# Patient Record
Sex: FEMALE | Race: WHITE
[De-identification: names, ages, dates, MRNs, and addresses within clinical notes are randomized per-mention and may not be internally consistent; named-entity substitution may affect disease eponyms.]

---

## 2018-05-17 ENCOUNTER — HOSPITAL ENCOUNTER (OUTPATIENT)
Dept: HOSPITAL 96 - M.MRI | Age: 63
End: 2018-05-17
Attending: ORTHOPAEDIC SURGERY
Payer: MEDICARE

## 2018-05-17 DIAGNOSIS — M25.462: ICD-10-CM

## 2018-05-17 DIAGNOSIS — M17.12: Primary | ICD-10-CM

## 2018-06-28 LAB
ALBUMIN SERPL-MCNC: 3.6 G/DL (ref 3.4–5)
ALP SERPL-CCNC: 116 U/L (ref 46–116)
ALT SERPL-CCNC: 32 U/L (ref 30–65)
ANION GAP SERPL CALC-SCNC: 5 MMOL/L (ref 7–16)
AST SERPL-CCNC: 30 U/L (ref 15–37)
BILIRUB SERPL-MCNC: 0.5 MG/DL
BILIRUB UR-MCNC: NEGATIVE MG/DL
BUN SERPL-MCNC: 18 MG/DL (ref 7–18)
CALCIUM SERPL-MCNC: 9.5 MG/DL (ref 8.5–10.1)
CHLORIDE SERPL-SCNC: 103 MMOL/L (ref 98–107)
CO2 SERPL-SCNC: 31 MMOL/L (ref 21–32)
COLOR UR: YELLOW
CREAT SERPL-MCNC: 1.1 MG/DL (ref 0.6–1.3)
GLUCOSE SERPL-MCNC: 94 MG/DL (ref 70–99)
HCT VFR BLD CALC: 38.1 % (ref 37–47)
HGB BLD-MCNC: 12.7 GM/DL (ref 12–15)
INR PPP: 1
KETONES UR STRIP-MCNC: NEGATIVE MG/DL
MCH RBC QN AUTO: 29.8 PG (ref 26–34)
MCHC RBC AUTO-ENTMCNC: 33.2 G/DL (ref 28–37)
MCV RBC: 89.7 FL (ref 80–100)
MPV: 11.4 FL. (ref 7.2–11.1)
PLATELET COUNT*: 171 THOU/UL (ref 150–400)
POTASSIUM SERPL-SCNC: 4 MMOL/L (ref 3.5–5.1)
PROT SERPL-MCNC: 7.5 G/DL (ref 6.4–8.2)
PROT UR QL STRIP: NEGATIVE
PROTHROMBIN TIME: 10 SECONDS (ref 9.2–11.5)
RBC # BLD AUTO: 4.25 MIL/UL (ref 4.2–5)
RBC # UR STRIP: NEGATIVE /UL
RDW-CV: 13.4 % (ref 10.5–14.5)
SODIUM SERPL-SCNC: 139 MMOL/L (ref 136–145)
SP GR UR STRIP: <= 1.005 (ref 1–1.03)
URINE CLARITY: CLEAR
URINE GLUCOSE-RANDOM: NEGATIVE
URINE LEUKOCYTES-REFLEX: NEGATIVE
URINE NITRITE-REFLEX: NEGATIVE
UROBILINOGEN UR STRIP-ACNC: 0.2 E.U./DL (ref 0.2–1)
WBC # BLD AUTO: 5.4 THOU/UL (ref 4–11)

## 2018-06-28 NOTE — EKG
[place] [address]
Phone:  [phone]                     ELECTROCARDIOGRAM REPORT      
_______________________________________________________________________________
 
Name:       THOR ARAIZA                Room:                      PRE IN  
[facility]#:  [id number]      Account #:      [id number]  
Admission:               Attend Phys:    IVANIA Baez
Discharge:               Date of Birth:  [birth year]  
         Report #: [id number]
    [id number]
_______________________________________________________________________________
THIS REPORT FOR:  //name//                      
 
                          [facility]
                                       
Test Date:    2018               Test Time:    09:02:06
Pat Name:     THOR ARAIZA            Department:   
Patient ID:   [id number]            Room:          
Gender:       F                        Technician:   
:          1955               Requested By: Jarvis Conley
Order Number: [id number]    Reading MD:   Alfonso Caballero
                                 Measurements
Intervals                              Axis          
Rate:         90                       P:            54
WY:           196                      QRS:          -8
QRSD:         86                       T:            32
QT:           356                                    
QTc:          436                                    
                           Interpretive Statements
Sinus rhythm
Compared to ECG 2008 13:11:12
T-wave abnormality no longer present
 
Electronically Signed On 2018 11:32:50 CDT by Alfonso Caballero
[email or web address]
 
 
 
 
 
 
 
 
 
 
 
 
 
 
 
 
 
 
 
  <ELECTRONICALLY SIGNED>
                                           By: Alfonso Caballero MD, [facility]   
  18     1132
D: 18 0902   _____________________________________
T: 18   Alfonso Caballero MD, FACC     /EPI

## 2018-07-10 ENCOUNTER — HOSPITAL ENCOUNTER (INPATIENT)
Dept: HOSPITAL 96 - M.PRE | Age: 63
LOS: 1 days | Discharge: HOME HEALTH | DRG: 470 | End: 2018-07-11
Attending: INTERNAL MEDICINE | Admitting: INTERNAL MEDICINE
Payer: MEDICARE

## 2018-07-10 VITALS — DIASTOLIC BLOOD PRESSURE: 84 MMHG | SYSTOLIC BLOOD PRESSURE: 134 MMHG

## 2018-07-10 VITALS — DIASTOLIC BLOOD PRESSURE: 72 MMHG | SYSTOLIC BLOOD PRESSURE: 131 MMHG

## 2018-07-10 VITALS — BODY MASS INDEX: 35.33 KG/M2 | HEIGHT: 62 IN | WEIGHT: 192 LBS

## 2018-07-10 VITALS — DIASTOLIC BLOOD PRESSURE: 69 MMHG | SYSTOLIC BLOOD PRESSURE: 110 MMHG

## 2018-07-10 DIAGNOSIS — Z96.1: ICD-10-CM

## 2018-07-10 DIAGNOSIS — D64.9: ICD-10-CM

## 2018-07-10 DIAGNOSIS — Z98.42: ICD-10-CM

## 2018-07-10 DIAGNOSIS — J45.909: ICD-10-CM

## 2018-07-10 DIAGNOSIS — G62.9: ICD-10-CM

## 2018-07-10 DIAGNOSIS — Z98.41: ICD-10-CM

## 2018-07-10 DIAGNOSIS — G43.909: ICD-10-CM

## 2018-07-10 DIAGNOSIS — Z96.651: ICD-10-CM

## 2018-07-10 DIAGNOSIS — I10: ICD-10-CM

## 2018-07-10 DIAGNOSIS — Z91.040: ICD-10-CM

## 2018-07-10 DIAGNOSIS — M25.462: ICD-10-CM

## 2018-07-10 DIAGNOSIS — M17.12: Primary | ICD-10-CM

## 2018-07-10 DIAGNOSIS — E66.9: ICD-10-CM

## 2018-07-10 DIAGNOSIS — K21.9: ICD-10-CM

## 2018-07-10 PROCEDURE — 0SRD0J9 REPLACEMENT OF LEFT KNEE JOINT WITH SYNTHETIC SUBSTITUTE, CEMENTED, OPEN APPROACH: ICD-10-PCS | Performed by: ORTHOPAEDIC SURGERY

## 2018-07-11 VITALS — SYSTOLIC BLOOD PRESSURE: 108 MMHG | DIASTOLIC BLOOD PRESSURE: 49 MMHG

## 2018-07-11 VITALS — DIASTOLIC BLOOD PRESSURE: 53 MMHG | SYSTOLIC BLOOD PRESSURE: 112 MMHG

## 2018-07-11 VITALS — SYSTOLIC BLOOD PRESSURE: 120 MMHG | DIASTOLIC BLOOD PRESSURE: 59 MMHG

## 2018-07-11 VITALS — SYSTOLIC BLOOD PRESSURE: 112 MMHG | DIASTOLIC BLOOD PRESSURE: 53 MMHG

## 2018-07-11 LAB
HCT VFR BLD CALC: 30.8 % (ref 37–47)
HGB BLD-MCNC: 10 GM/DL (ref 12–15)

## 2018-07-11 NOTE — OP
[facility] 
[address] [place]  [address]                    OPERATIVE REPORT              
_______________________________________________________________________________
 
Name:       THOR ARAIZA                Room:           [address] [place]  
.R.#:  [id number]      Account #:      [id number]  
Admission:  07/10/18     Attend Phys:    IVANIA Baez
Discharge:  07/11/18     Date of Birth:  [birth year]  
         Report #: [id number]
                                                                     [id number]  
_______________________________________________________________________________
THIS REPORT FOR:  //name//                      
 
CC: Jarvis Painter
 
DATE OF SERVICE:  07/10/2018
 
 
PREOPERATIVE DIAGNOSIS:  Left knee osteoarthritis.
 
POSTOPERATIVE DIAGNOSIS:  Left knee osteoarthritis.
 
PROCEDURE:  Left total knee arthroplasty.
 
ANESTHESIA:  General endotracheal.
 
ESTIMATED BLOOD LOSS:  50 mL.
 
SURGEON:  Jarvis Conley II, DO
 
FIRST ASSISTANT:  YOSVANY Fish.
 
ANTIBIOTICS:  Ancef preoperatively.
 
DRAINS:  Medium Hemovac.
 
COMPLICATIONS:  None.
 
CONDITION:  The patient stable to recovery room.
 
IMPLANTS:  Listed in the operative record and progress note.
 
BRIEF HISTORY:  The patient was seen in preoperative area.  Preoperative H and P
was performed.  Site was marked, questions were answered.  Risks and benefits
were discussed with the patient in detail about the surgery.  The patient wished
to procedure, assumed all risks.
 
DESCRIPTION OF PROCEDURE:  The patient was taken to the operative suite, placed
supine on the operating room table and given appropriate anesthesia.  A
well-padded tourniquet was applied in the upper thigh, which inflated to 300
mmHg after gravity exsanguination.  The operative knee was sterilely prepped and
draped.  Surgery was begun by midline incision.  This was carried down to
subcutaneous tissues.  A medial parapatellar arthrotomy was performed and
carried down to the bone.  Patella was then everted and excess soft tissue
removed from around the femur.  A femoral cutting guide was then applied,
 
 
 
[place] [id number]                    OPERATIVE REPORT              
_______________________________________________________________________________
 
Name:       THOR ARAIZA                Room:           [address]#:  [id number]      Account #:      [id number]  
Admission:  07/10/18     Attend Phys:    IVANIA Baez
Discharge:  07/11/18     Date of Birth:  [birth year]  
         Report #: [id number]
                                                                     [id number]  
_______________________________________________________________________________
checked with a drop hilary for rotational alignment, pinned into appropriate
position and appropriate cuts were made.  A 4-in-1 cutting block was then
applied, checked for rotational alignment, pinned in appropriate position and
appropriate cuts were made.  The tibia was then exposed.  The excess meniscus
was removed.  Retraction was placed on the collateral ligaments.  The tibial
cutting block was then applied, pinned in appropriate position, checked with a
drop hilary for rotational alignment and slope and appropriate cut was made. 
Tibial bone was removed.  Tibial base plate was then applied and checked for
rotational alignment with the drop hilray and pinned in appropriate position.  The
femur was then applied in box that was reamed.  This was then trialed with the
appropriate spacer, which showed excellent fit and fill and excellent stability
of the knee through all range of motion.  The patella was then reamed in
appropriate fashion and sized to appropriate size.  Three peg holes were
drilled.  It was then trialed and it showed excellent flexion and extension and
excellent tracking of the patella within the groove.  These trials were then
removed.  The tibia was punched in appropriate fashion.  Bone ends were cleansed
with Pulsavac irrigation and cement was mixed and applied to the final implants.
 These were then malleted in position and held the knee in extension and
compressed to allow the cement to cure.  After it cured, excess was removed
utilizing a [place]man and osteotome.  The wound was then copiously irrigated and the
final spacer was then malleted in position.  The tourniquet was deflated. 
Hemostasis was maintained with electrocautery and cocktail was injected.  PRP
gel was sprayed throughout the internal aspects of the knee.  A medium Hemovac
drain was applied.  Capsule was closed with two FiberWire in a figure-of-eight
fashion.  Skin was closed with 2-0 Vicryl and running 3-0 Monocryl.  Dermabond
and sterile dressing applied.  Ace wrap and PolarCare were applied.  The patient
was transported to the recovery in stable condition.  Counts were correct
throughout the procedure.
 
 
 
 
 
 
 
 
 
 
 
 
 
 
 
 
<ELECTRONICALLY SIGNED>
                                        By:  Jarvis Conley II, DO     
07/11/18     2145
D: 07/11/18 1216_______________________________________
T: 07/11/18 1337Jarvis Conley II, DO        /nt